# Patient Record
Sex: FEMALE | Race: WHITE | NOT HISPANIC OR LATINO | Employment: OTHER | URBAN - NONMETROPOLITAN AREA
[De-identification: names, ages, dates, MRNs, and addresses within clinical notes are randomized per-mention and may not be internally consistent; named-entity substitution may affect disease eponyms.]

---

## 2017-01-18 ENCOUNTER — OFFICE VISIT (OUTPATIENT)
Dept: SURGERY | Facility: CLINIC | Age: 52
End: 2017-01-18

## 2017-01-18 VITALS — HEIGHT: 63 IN | BODY MASS INDEX: 20.91 KG/M2 | WEIGHT: 118 LBS

## 2017-01-18 DIAGNOSIS — R10.84 GENERALIZED ABDOMINAL PAIN: Primary | ICD-10-CM

## 2017-01-18 PROCEDURE — 99212 OFFICE O/P EST SF 10 MIN: CPT | Performed by: SURGERY

## 2017-01-18 NOTE — LETTER
January 19, 2017     No Known Provider  [facility] [place] [address]    Patient: Daphney Alcantar   YOB: 1965   Date of Visit: 1/18/2017       Dear  Provider:    Thank you for referring Daphney Alcantar to me for evaluation. Below are the relevant portions of my assessment and plan of care.           51-year-old female with chronic abdominal pain.  She will be referred to a pain clinic to manage her chronic pain is surgical intervention would likely lead to more complications.               If you have questions, please do not hesitate to call me. I look forward to following Daphney along with you.         Sincerely,        Shukri Alvarenga MD        CC: No Recipients

## 2017-01-18 NOTE — MR AVS SNAPSHOT
"Daphney Alcantar   1/18/2017 4:10 PM   Office Visit    Dept Phone:  [phone]   Encounter #:  [id number]    Provider:  Shukri Alvarenga MD   Department:  [facility] GENERAL SURGERY                Your Full Care Plan              Your Updated Medication List          This list is accurate as of: 1/18/17  4:31 PM.  Always use your most recent med list.                albuterol 108 (90 BASE) MCG/ACT inhaler   Commonly known as:  PROVENTIL HFA;VENTOLIN HFA       busPIRone 10 MG tablet   Commonly known as:  BUSPAR   Take 1 tablet by mouth 3 (Three) Times a Day.       naproxen 375 MG tablet   Commonly known as:  NAPROSYN       NEURONTIN 800 MG tablet   Generic drug:  gabapentin       traZODone 150 MG tablet   Commonly known as:  DESYREL   Take 1 tablet by mouth Every Night.       Vortioxetine HBr 10 MG tablet   Take 10 mg by mouth Daily.               Instructions     None    Patient Instructions History      Upcoming Appointments     Visit Type Date Time Department    FOLLOW UP 1/18/2017  4:10 PM [facility]      MyChart Signup     Our records indicate that you have declined [email or web address] signup. If you would like to sign up for [email or web address], please email [email or web address] or call [phone] to obtain an activation code.             Other Info from Your Visit           Allergies     Sulfa Antibiotics        Reason for Visit     EGD           Vital Signs     Height Weight Body Mass Index Smoking Status          63\" (160 cm) 118 lb (53.5 kg) 20.9 kg/m2 Current Every Day Smoker          "

## 2017-01-19 NOTE — PROGRESS NOTES
Subjective   Daphney Alcantar is a 51 y.o. female  is here today for follow-up.         Daphney Alcantar is a 51 y.o. female here for follow-up concerning abdominal pain.  She has known chronic abdominal pain after multiple surgeries related to complicated gastric bypass.  She has known marginal ulcer as well.  She has no dysphasia or unintentional weight loss.  There is no surgical treatment for her underlying pain is chronic in nature.            Daphney was seen today for egd.    Diagnoses and all orders for this visit:    Generalized abdominal pain        Assessment     51-year-old female with chronic abdominal pain.  She will be referred to a pain clinic to manage her chronic pain is surgical intervention would likely lead to more complications.

## 2018-03-14 ENCOUNTER — OFFICE VISIT (OUTPATIENT)
Dept: SURGERY | Facility: CLINIC | Age: 53
End: 2018-03-14

## 2018-03-14 VITALS — WEIGHT: 115 LBS | HEIGHT: 63 IN | BODY MASS INDEX: 20.38 KG/M2

## 2018-03-14 DIAGNOSIS — K59.03 DRUG-INDUCED CONSTIPATION: Primary | ICD-10-CM

## 2018-03-14 DIAGNOSIS — Z98.84 HISTORY OF ROUX-EN-Y GASTRIC BYPASS: ICD-10-CM

## 2018-03-14 PROBLEM — K59.00 CONSTIPATION: Status: ACTIVE | Noted: 2018-03-14

## 2018-03-14 PROCEDURE — 99213 OFFICE O/P EST LOW 20 MIN: CPT | Performed by: SURGERY

## 2018-03-14 RX ORDER — SUCRALFATE ORAL 1 G/10ML
1 SUSPENSION ORAL 4 TIMES DAILY
Qty: 420 ML | Refills: 1 | Status: SHIPPED | OUTPATIENT
Start: 2018-03-14

## 2018-03-14 RX ORDER — DOCUSATE SODIUM 250 MG
250 CAPSULE ORAL DAILY
Qty: 60 CAPSULE | Refills: 0 | Status: SHIPPED | OUTPATIENT
Start: 2018-03-14

## 2018-03-14 RX ORDER — BISACODYL 5 MG/1
5 TABLET, DELAYED RELEASE ORAL DAILY PRN
COMMUNITY

## 2018-03-16 NOTE — PROGRESS NOTES
Subjective   Daphney Alcantar is a 52 y.o. female  is here today for follow-up.         Daphney Alcantar is a 52 y.o. female here for follow up for abdominal pain.  The patient is well-known to my service and is had accommodated history after marginal ulcer perforation requiring extensive abdominal surgery with ostomy diversion and chronic abdominal pain and constipation.  Recent CT scan is been reviewed and there is no new development of a hernia at her previous stoma site.  She has significant constipation which I believe is the source of her chronic pain.      Physical Examination:  Abdomen soft, nontender  Surgical scars well-healed          Daphney was seen today for chronic constipation.    Diagnoses and all orders for this visit:    Drug-induced constipation    History of Gibson-en-Y gastric bypass    Other orders  -     docusate sodium (COLACE) 250 MG capsule; Take 1 capsule by mouth Daily.  -     sucralfate (CARAFATE) 1 GM/10ML suspension; Take 10 mL by mouth 4 (Four) Times a Day.        Assessment     Daphney Alcantar is a 52 y.o. female with abdominal pain that is chronic in nature and secondary to constipation.  I've told her limiting narcotic therapy will improve her constipation and eliminate the cycle of pain related to constipation secondary to narcotics.  She'll continue on aggressive bowel regimen and follow-up as needed.

## 2021-07-13 ENCOUNTER — OFFICE VISIT (OUTPATIENT)
Dept: SURGERY | Facility: CLINIC | Age: 56
End: 2021-07-13

## 2021-07-13 VITALS
HEIGHT: 63 IN | SYSTOLIC BLOOD PRESSURE: 133 MMHG | DIASTOLIC BLOOD PRESSURE: 84 MMHG | WEIGHT: 170 LBS | HEART RATE: 100 BPM | BODY MASS INDEX: 30.12 KG/M2

## 2021-07-13 DIAGNOSIS — R10.13 EPIGASTRIC PAIN: Primary | ICD-10-CM

## 2021-07-13 PROCEDURE — 99202 OFFICE O/P NEW SF 15 MIN: CPT | Performed by: SURGERY

## 2021-07-14 NOTE — PROGRESS NOTES
Subjective   Daphney Alcantar is a 56 y.o. female  is here today for follow-up.         Daphney Alcantar is a 56 y.o. female here for follow up for an abdominal wound.  She has a complex surgical history with long healing open wound and chronic abdominal pain.  She presents with inflammatory change at the inferior portion of her scar which was a previous open abdomen healed by secondary intent.  There is no evidence of bleeding or fistula but the area is thin and the underlying bowel may be prone to injury if biopsy attempted without preoperative imaging.        Assessment     Diagnoses and all orders for this visit:    1. Epigastric pain (Primary)  -     CT Abdomen Pelvis With Contrast      Daphney Alcantar is a 56 y.o. female with chronic wound of the abdomen.  She will require skin biopsy but CT will be performed to delineate the distance between the underlying small bowel given she has had a previous open abdomen with skin grafting.

## 2021-08-04 ENCOUNTER — HOSPITAL ENCOUNTER (OUTPATIENT)
Dept: CT IMAGING | Facility: HOSPITAL | Age: 56
Discharge: HOME | End: 2021-08-04
Admitting: SURGERY

## 2021-08-04 LAB — CREAT BLDA-MCNC: 0.6 MG/DL (ref 0.6–1.3)

## 2021-08-04 PROCEDURE — 74177 CT ABD & PELVIS W/CONTRAST: CPT | Performed by: RADIOLOGY

## 2021-08-04 PROCEDURE — 74177 CT ABD & PELVIS W/CONTRAST: CPT

## 2021-08-04 PROCEDURE — 25010000002 IOPAMIDOL 61 % SOLUTION: Performed by: SURGERY

## 2021-08-04 PROCEDURE — 82565 ASSAY OF CREATININE: CPT

## 2021-08-04 RX ADMIN — IOPAMIDOL 80 ML: 612 INJECTION, SOLUTION INTRAVENOUS at 10:59

## 2021-08-10 ENCOUNTER — OFFICE VISIT (OUTPATIENT)
Dept: SURGERY | Facility: CLINIC | Age: 56
End: 2021-08-10

## 2021-08-10 VITALS — BODY MASS INDEX: 30.12 KG/M2 | HEIGHT: 63 IN | WEIGHT: 170 LBS

## 2021-08-10 DIAGNOSIS — Z98.84 HISTORY OF ROUX-EN-Y GASTRIC BYPASS: Primary | ICD-10-CM

## 2021-08-10 PROCEDURE — 11102 TANGNTL BX SKIN SINGLE LES: CPT | Performed by: SURGERY

## 2021-08-10 NOTE — PROGRESS NOTES
Subjective   Daphney Alcantar is a 56 y.o. female  is here today for follow-up.         Daphney Alcantar is a 56 y.o. female here for follow up for skin biopsy.  The patient has chronic ulcerated area at the site of chronic wound on her abdomen after multiple abdominal surgeries.  Due to the chronic ulcerated nature of the inferior portion of the wound skin biopsy will be obtained to exclude margins ulcer.    Patient had was prepped and draped in usual sterile fashion.  Timeout procedure was performed.  An elliptical incision including the margin of the wound in the area of ulcerated tissue was made and the area was excised.  An approximately 4 x 3 mm area of skin was removed.  The wound was hemostatic and closed with interrupted nylon sutures and a bandage applied.  Patient tolerated the procedure well.    Estimated blood loss: Less than 5 mL    Specimens: Skin biopsy abdomen    Complications: None        Assessment     Diagnoses and all orders for this visit:    1. History of Gibson-en-Y gastric bypass (Primary)      Daphney Alcantar is a 56 y.o. female who underwent skin biopsy in the office today for chronic wound to exclude Bartholin's ulcer.  Patient will follow up in 2 weeks for suture removal and to discuss pathology.

## 2021-08-24 ENCOUNTER — OFFICE VISIT (OUTPATIENT)
Dept: SURGERY | Facility: CLINIC | Age: 56
End: 2021-08-24

## 2021-08-24 VITALS — HEIGHT: 63 IN | BODY MASS INDEX: 30.12 KG/M2 | WEIGHT: 170 LBS

## 2021-08-24 DIAGNOSIS — Z98.84 HISTORY OF ROUX-EN-Y GASTRIC BYPASS: Primary | ICD-10-CM

## 2021-08-24 PROCEDURE — 99212 OFFICE O/P EST SF 10 MIN: CPT | Performed by: SURGERY

## 2021-08-25 NOTE — PROGRESS NOTES
Subjective   Daphney Alcantar is a 56 y.o. female  is here today for follow-up.         Daphney Alcantar is a 56 y.o. female here for follow up after biopsy of chronic wound of the lower abdomen along her midline incision.  She has a complicated surgical history due to complications from Gibson-en-Y gastric bypass.  She has a chronic ulcerated area on the lower abdomen that biopsy shows to be consistent with ulceration no evidence of cancer or malignancy.  The wound is stable and will likely continue to epithelialize and heal.      Assessment     Diagnoses and all orders for this visit:    1. History of Gibson-en-Y gastric bypass (Primary)      Daphney Alcantar is a 56 y.o. female status post Gibson-en-Y gastric bypass complicated by the need for multiple open operations and open abdomen with a wound in the midline does not heal by secondary intent.  She has a central area of ulceration at the lower midline wound that biopsy shows to be without evidence of malignancy.  Suture removed in the office today.  No surgical intervention required.  Follow-up as needed and continue current wound care.